# Patient Record
Sex: FEMALE | Race: WHITE | Employment: FULL TIME | URBAN - METROPOLITAN AREA
[De-identification: names, ages, dates, MRNs, and addresses within clinical notes are randomized per-mention and may not be internally consistent; named-entity substitution may affect disease eponyms.]

---

## 2017-03-22 ENCOUNTER — OP HISTORICAL/CONVERTED ENCOUNTER (OUTPATIENT)
Dept: OTHER | Age: 37
End: 2017-03-22

## 2017-11-15 ENCOUNTER — HOSPITAL ENCOUNTER (OUTPATIENT)
Age: 37
Setting detail: OUTPATIENT SURGERY
Discharge: HOME | End: 2017-11-15
Attending: SPECIALIST | Admitting: SPECIALIST
Payer: COMMERCIAL

## 2017-11-15 VITALS
HEART RATE: 78 BPM | RESPIRATION RATE: 16 BRPM | DIASTOLIC BLOOD PRESSURE: 66 MMHG | WEIGHT: 125 LBS | OXYGEN SATURATION: 100 % | BODY MASS INDEX: 24.54 KG/M2 | SYSTOLIC BLOOD PRESSURE: 123 MMHG | HEIGHT: 60 IN

## 2017-11-15 PROCEDURE — 77030020268 HC MISC GENERAL SUPPLY: Performed by: SPECIALIST

## 2017-11-15 PROCEDURE — 76040000007: Performed by: SPECIALIST

## 2017-11-15 RX ORDER — BUTALBITAL, ASPIRIN, AND CAFFEINE 325; 50; 40 MG/1; MG/1; MG/1
1 CAPSULE ORAL
COMMUNITY

## 2017-11-15 RX ORDER — DIHYDROERGOTAMINE MESYLATE 4 MG/ML
1 SPRAY NASAL AS NEEDED
COMMUNITY

## 2017-11-15 RX ORDER — FROVATRIPTAN SUCCINATE 2.5 MG/1
2.5 TABLET, FILM COATED ORAL
COMMUNITY

## 2017-11-15 RX ORDER — CYCLOBENZAPRINE HCL 5 MG
5 TABLET ORAL
COMMUNITY

## 2017-11-15 NOTE — PROGRESS NOTES
Rectal exam done by Tiffany Fan RN. Anal manometry catheter inserted into rectum. Manometry procedure complete. Catheter inserted into rectum. Balloon filled with 40 cc of luke warm H2O, and pt escorted to bathroom for 3 min expulsion test.  Pt was not able to expel balloon. Balloon deflated and catheter removed. Pt tolerated procedures well.

## 2017-11-15 NOTE — IP AVS SNAPSHOT
[address] [facility] 
[phone] Patient: Kam Lee MRN: [id number] :1980 About your hospitalization You were admitted on:  November 15, 2017 You last received care in the:  [facility] ENDOSCOPY You were discharged on:  November 15, 2017 Why you were hospitalized Your primary diagnosis was:  Not on File Discharge Orders None A check jose indicates which time of day the medication should be taken. My Medications ASK your physician about these medications Instructions Each Dose to Equal  
 Morning Noon Evening Bedtime  
 butalbital-aspirin-caffeine capsule Commonly known as:  Wandra Mitten Your last dose was: Your next dose is: Take 1 Cap by mouth every four (4) hours as needed for Pain. 1 Cap  
    
   
   
   
  
 cyclobenzaprine 5 mg tablet Commonly known as:  FLEXERIL Your last dose was: Your next dose is: Take 5 mg by mouth. 5 mg  
    
   
   
   
  
 dihydroergotamine 0.5 mg/pump act. (4 mg/mL) nasal spray Commonly known as:  Reinier Quesada Your last dose was: Your next dose is:    
   
   
 1 Spray by Nasal route as needed for Migraine. use in one nostril as directed. No more than 4 sprays in one hour 1 Spray  
    
   
   
   
  
 frovatriptan 2.5 mg tablet Commonly known as:  El Dinning Your last dose was: Your next dose is: Take 2.5 mg by mouth once as needed for Migraine. 2.5 mg  
    
   
   
   
  
 LINZESS 290 mcg Cap capsule Generic drug:  linaclotide Your last dose was: Your next dose is: Take  by mouth Daily (before breakfast). MULTIVITAMIN PO Your last dose was: Your next dose is: Take  by mouth. Discharge Instructions Kam Lee [id number] 
1980 MANOMETRY DISCHARGE INSTRUCTION You may resume your regular diet as tolerated. You may resume your normal daily activities Call your Physician if you have any complications or questions. [employer] Activation Thank you for requesting access to [employer]. Please follow the instructions below to securely access and download your online medical record. [employer] allows you to send messages to your doctor, view your test results, renew your prescriptions, schedule appointments, and more. How Do I Sign Up? 1. In your internet browser, go to [email or web address] 
2. Click on the First Time User? Click Here link in the Sign In box. You will be redirect to the New Member Sign Up page. 3. Enter your [employer] Access Code exactly as it appears below. You will not need to use this code after youve completed the sign-up process. If you do not sign up before the expiration date, you must request a new code. [employer] Access Code: [email or web address] Expires: 2018  1:35 PM (This is the date your [employer] access code will ) 4. Enter the last four digits of your Social Security Number (xxxx) and Date of Birth (mm/dd/yyyy) as indicated and click Submit. You will be taken to the next sign-up page. 5. Create a [employer] ID. This will be your [employer] login ID and cannot be changed, so think of one that is secure and easy to remember. 6. Create a [employer] password. You can change your password at any time. 7. Enter your Password Reset Question and Answer. This can be used at a later time if you forget your password. 8. Enter your e-mail address. You will receive e-mail notification when new information is available in [address] 9. Click Sign Up. You can now view and download portions of your medical record. 10. Click the Download Summary menu link to download a portable copy of your medical information. Additional Information If you have questions, please visit the Frequently Asked Questions section of the [employer] website at [email or web address] ([email or web address]). [employer]/. Remember, AddressReport is NOT to be used for urgent needs. For medical emergencies, dial 911. Introducing [facility]! David Hutton introduces AddressReport patient portal. Now you can access parts of your medical record, email your doctor's office, and request medication refills online. 1. In your internet browser, go to [email or web address] ([email or web address]). [employer] 2. Click on the First Time User? Click Here link in the Sign In box. You will see the New Member Sign Up page. 3. Enter your AddressReport Access Code exactly as it appears below. You will not need to use this code after youve completed the sign-up process. If you do not sign up before the expiration date, you must request a new code. · [employer] Access Code: [email or web address] Expires: 2/13/2018  1:35 PM 
 
4. Enter the last four digits of your Social Security Number (xxxx) and Date of Birth (mm/dd/yyyy) as indicated and click Submit. You will be taken to the next sign-up page. 5. Create a AddressReport ID. This will be your AddressReport login ID and cannot be changed, so think of one that is secure and easy to remember. 6. Create a AddressReport password. You can change your password at any time. 7. Enter your Password Reset Question and Answer. This can be used at a later time if you forget your password. 8. Enter your e-mail address. You will receive e-mail notification when new information is available in [address] 9. Click Sign Up. You can now view and download portions of your medical record. 10. Click the Download Summary menu link to download a portable copy of your medical information. If you have questions, please visit the Frequently Asked Questions section of the AddressReport website. Remember, AddressReport is NOT to be used for urgent needs. For medical emergencies, dial 911. Now available from your iPhone and Android! Providers Seen During Your Hospitalization Provider Specialty Primary office phone Tanya Dubon MD Gastroenterology [phone] Your Primary Care Physician (PCP) Primary Care Physician Office Phone Office Fax Julisa Felder [phone] You are allergic to the following Allergen Reactions Latex Hives Pcn (Penicillins) Unknown (comments) Reaction as a child she was not sure exactly what Recent Documentation Height Weight Breastfeeding? BMI Smoking Status 1.524 m 56.7 kg No 24.41 kg/m2 Never Smoker Emergency Contacts Name Discharge Info Relation Home Work Mobile Freddy Meier DISCHARGE CAREGIVER [3] Spouse [3] [phone] Patient Belongings The following personal items are in your possession at time of discharge: 
  Dental Appliances: None Please provide this summary of care documentation to your next provider. Signatures-by signing, you are acknowledging that this After Visit Summary has been reviewed with you and you have received a copy. Patient Signature:  ____________________________________________________________ Date:  ____________________________________________________________  
  
[facility] Provider Signature:  ____________________________________________________________ Date:  ____________________________________________________________

## 2017-12-21 ENCOUNTER — OP HISTORICAL/CONVERTED ENCOUNTER (OUTPATIENT)
Dept: OTHER | Age: 37
End: 2017-12-21

## 2018-01-24 ENCOUNTER — HOSPITAL ENCOUNTER (OUTPATIENT)
Dept: MRI IMAGING | Age: 38
Discharge: HOME | End: 2018-01-24
Attending: SPECIALIST
Payer: COMMERCIAL

## 2018-01-24 DIAGNOSIS — K64.8 PROLAPSED HEMORRHOIDS: ICD-10-CM

## 2018-01-24 DIAGNOSIS — R10.9 ABDOMINAL PAIN: ICD-10-CM

## 2018-01-24 DIAGNOSIS — K59.00 CONSTIPATION: ICD-10-CM

## 2018-01-24 PROCEDURE — 72195 MRI PELVIS W/O DYE: CPT

## 2018-01-24 PROCEDURE — 74011000255 HC RX REV CODE- 255: Performed by: SPECIALIST

## 2018-01-24 RX ADMIN — BARIUM SULFATE 900 ML: 1 SUSPENSION ORAL at 11:19

## 2019-02-08 ENCOUNTER — OP HISTORICAL/CONVERTED ENCOUNTER (OUTPATIENT)
Dept: OTHER | Age: 39
End: 2019-02-08

## 2019-02-22 ENCOUNTER — OP HISTORICAL/CONVERTED ENCOUNTER (OUTPATIENT)
Dept: OTHER | Age: 39
End: 2019-02-22

## 2020-09-02 RX ORDER — PHENTERMINE HYDROCHLORIDE 37.5 MG/1
TABLET ORAL
Qty: 90 TAB | Refills: 1 | Status: SHIPPED | OUTPATIENT
Start: 2020-09-02 | End: 2021-04-23

## 2020-10-26 DIAGNOSIS — K74.3 PRIMARY BILIARY CIRRHOSIS (HCC): Primary | ICD-10-CM

## 2020-11-24 ENCOUNTER — OFFICE VISIT (OUTPATIENT)
Dept: HEMATOLOGY | Age: 40
End: 2020-11-24
Payer: COMMERCIAL

## 2020-11-24 VITALS
WEIGHT: 113 LBS | RESPIRATION RATE: 18 BRPM | DIASTOLIC BLOOD PRESSURE: 67 MMHG | HEART RATE: 96 BPM | BODY MASS INDEX: 21.34 KG/M2 | TEMPERATURE: 97 F | SYSTOLIC BLOOD PRESSURE: 102 MMHG | OXYGEN SATURATION: 100 % | HEIGHT: 61 IN

## 2020-11-24 DIAGNOSIS — R76.8 ANTIMITOCHONDRIAL ANTIBODY POSITIVE: Primary | ICD-10-CM

## 2020-11-24 PROBLEM — K58.9 IBS (IRRITABLE BOWEL SYNDROME): Status: ACTIVE | Noted: 2020-11-24

## 2020-11-24 PROBLEM — D64.9 ANEMIA: Status: ACTIVE | Noted: 2020-11-24

## 2020-11-24 PROBLEM — G43.909 MIGRAINE: Status: ACTIVE | Noted: 2020-11-24

## 2020-11-24 PROCEDURE — 99205 OFFICE O/P NEW HI 60 MIN: CPT | Performed by: HOSPITALIST

## 2020-11-24 NOTE — PATIENT INSTRUCTIONS
We will perform blood work today We will schedule you for a liver ultrasound We will have you return to the clinic in 4 weeks for fibro scan to assess for fibrosis or scarring of your liver

## 2020-11-24 NOTE — PROGRESS NOTES
Chief Complaint   Patient presents with   [facility].     new patient, elevated labs         1. Have you been to the ER, urgent care clinic since your last visit? Hospitalized since your last visit? no    2. Have you seen or consulted any other health care providers outside of the [address] [place] [address] since your last visit? Include any pap smears or colon screening.   no

## 2020-11-24 NOTE — PROGRESS NOTES
[address]      Adithya Draper MD, Gerber Morgan, Thee Bhagat MD, MPH      Florencio Liu, SHAN Husain, [facility]     Nakia Mckeon, [facility]   [facility] Quint[facility]    Theresa Marcial, [facility]       Adrian Davis [facility] 136    at [address], [address] [place] [address], [facility] 22.    [phone]    FAX: [address] [place] [address] Avenue    [facility]    [address], [address]    [address] sh[address]is, [address]    [phone]    FAX: [phone]     Patient Care Team:  Savi Fitzpatrick MD as PCP - General (Family Medicine)    Problem List  Date Reviewed: 11/24/2020          Codes Class Noted    Anemia ICD-10-CM: D64.9  ICD-9-CM: 285.9  11/24/2020        Migraine ICD-10-CM: N31.932  ICD-9-CM: 346.90  11/24/2020        IBS (irritable bowel syndrome) ICD-10-CM: K58.9  ICD-9-CM: 564.1  11/24/2020        VSD (ventricular septal defect and aortic arch hypoplasia ICD-10-CM: Q21.0, Q25.42  ICD-9-CM: 745.4, 747.21  11/17/2011        Dizzy spells ICD-10-CM: R42  ICD-9-CM: 780.4  11/17/2011            The clinicians listed above have asked me to see Lester Reyes in consultation regarding positive anti mitochondrial antibody  All medical records sent by the referring physicians we re reviewed     The patient is a 37year old  female who was noted to have tested positive for anti mitochondrial antibody after routine screening for hair loss at work. Blood work performed was positive for AMA at 37    The result of liver function test is not available for me to review. Imaging of the liver was either not performed or the results are not available to me. An assessment of liver fibrosis with biopsy or elastography has not been performed. The patient has no extrahepatic autoimmune disorders.         The patient reports fatigue    The patient is not currently experiencing the following symptoms of liver disease: pain in the right side over the liver,  yellowing of the eyes or skin,   problems concentrating, swelling of the abdomen, swelling of the lower extremities, hematemesis, hematochezia. The patient completes all daily activities without any functional limitations. ASSESSMENT AND PLAN:    Positive antimitochondrial antibody and normal alkaline phosphatase  Liver transaminase is normal, Alkaline phosphatase is normal, the liver function is normal, total bilirubin is normal. Serum platelet is normal.  Based upon laboratory studies and imaging  the patient does not appear to have significant liver injury. There is no evidence that patient has primary biliary cholangitis. We will have to fulfill 2 of 3 diagnostic criteria in order to diagnose PBC. The need to perform an assessment of liver fibrosis was discussed with the patient. The Fibroscan can assess liver fibrosis and determine if a patient has advanced fibrosis or cirrhosis without the need for liver biopsy. This will be performed at the next office visit. If the Fibroscan suggests advanced fibrosis then a liver biopsy should be considered. Have performed laboratory testing to monitor liver function and degree of liver injury. This included BMP, hepatic panel, CBC with platelet count, INR. Will perform serologic and virologic studies to assess for other causes of chronic liver disease. Will perform imaging of the liver with ultrasound. Vitamin malabsorption  Patients with PBC do not efficiently absorb fat soluble vitamins A,D,E, K. It has been recommended that the patient take multivitamins with excess fat soluble vitamins. Breast cancer screeing   Women with PBC have an increased risk of breast cancer and should undergo regular mammography screenings.     Screening for Hepatocellular Carcinoma  HCC screening is not necessary if the patient has no evidence of cirrhosis. Treatment of other medical problems in patients with chronic liver disease  There are no contraindications for the patient to take most medications that are necessary for treatment of other medical issues. Counseling for alcohol in patients with chronic liver disease  The patient does not have a chronic liver disease and does not have to be abstinent from alcohol. Vaccinations   The need for vaccination against viral hepatitis A and B will be assessed with serologic and instituted as appropriate. Routine vaccinations against other bacterial and viral agents can be performed as indicated. Annual flu vaccination should be administered if indicated. Iron deficiency anemia  Serum ferritin is 2, iron saturation is 3%. We will refer patient to infusion center for intravenous iron infusion. Thrombocytosis  This is reactionary from iron deficiency anemia. ALLERGIES  Allergies   Allergen Reactions    Latex Hives    Pcn [Penicillins] Unknown (comments)     Reaction as a child she was not sure exactly what       MEDICATIONS  Current Outpatient Medications   Medication Sig    linaclotide (LINZESS) 290 mcg cap capsule Take  by mouth Daily (before breakfast).  dihydroergotamine (MIGRANAL) 0.5 mg/pump act. (4 mg/mL) nasal spray 1 Spray by Nasal route as needed for Migraine. use in one nostril as directed. No more than 4 sprays in one hour    frovatriptan (FROVA) 2.5 mg tablet Take 2.5 mg by mouth once as needed for Migraine.  phentermine (ADIPEX-P) 37.5 mg tablet TAKE ONE-HALF TABLET BY MOUTH TWICE A DAY BEFORE BREAKFAST AND AFTER LUNCH    butalbital-aspirin-caffeine (FIORINAL) capsule Take 1 Cap by mouth every four (4) hours as needed for Pain.  cyclobenzaprine (FLEXERIL) 5 mg tablet Take 5 mg by mouth.  MULTIVITAMIN PO Take  by mouth. No current facility-administered medications for this visit.         SYSTEM REVIEW NOT RELATED TO LIVER DISEASE OR REVIEWED ABOVE:  Constitution systems: Negative for fever, chills, weight gain, weight loss. Eyes: Negative for visual changes. ENT: Negative for sore throat, painful swallowing. Respiratory: Negative for cough, hemoptysis, SOB. Cardiology: Negative for chest pain, palpitations. GI:  Negative for constipation or diarrhea. : Negative for urinary frequency, dysuria, hematuria, nocturia. Skin: Negative for rash. Hematology: Negative for easy bruising, blood clots. Musculo-skelatal: Negative for back pain, muscle pain, weakness. Neurologic: Negative for headaches, dizziness, vertigo, memory problems not related to HE. Psychology: Negative for anxiety, depression. FAMILY HISTORY:  The father  Has/had the following following chronic disease(s): Dyslipidemia   The mother Has/had the following chronic disease(s): HTN, dyslipidemia  There is no family history of liver disease. There is no family history of immune disorders. SOCIAL HISTORY:  The patient is . The patient has 2 children. The patient has never used tobacco products. The patient drink alcohol occasionally  The patient currently works full time as a     PHYSICAL EXAMINATION:  Visit Vitals  /67   Pulse 96   Temp 97 °F (36.1 °C) (Temporal)   Resp 18   Ht 5' 1\" (1.549 m)   Wt 113 lb (51.3 kg)   SpO2 100%   BMI 21.35 kg/m²     General: No acute distress. Eyes: Sclera anicteric. ENT: No oral lesions. Thyroid normal.  Nodes: No adenopathy. Skin: No spider angiomata. No jaundice. No palmar erythema. Respiratory: Lungs clear to auscultation. Cardiovascular: Regular heart rate. No murmurs. No JVD. Abdomen: Soft non-tender. Liver size normal to percussion/palpation. Spleen not palpable. No obvious ascites. Extremities: No edema. No muscle wasting. No gross arthritic changes. Neurologic: Alert and oriented. Cranial nerves grossly intact.   No asterixis. LABORATORY STUDIES:  Recent liver function panel, CBC with platelet count and BMP are not available. These studies will be performed. Liver [place] of [address] [place] [address] Ref Rng & Units 11/24/2020   WBC 3.6 - 11.0 K/uL 5.3   ANC 1.8 - 8.0 K/UL 3.1   HGB 11.5 - 16.0 g/dL 9.7 (L)    - 400 K/uL 438 (H)   AST 15 - 37 U/L 20   ALT 12 - 78 U/L 20   Alk Phos 45 - 117 U/L 66   Bili, Total 0.2 - 1.0 MG/DL 0.2   Albumin 3.5 - 5.0 g/dL 3.8   BUN 6 - 20 MG/DL 8   Creat 0.55 - 1.02 MG/DL 0.78   Na 136 - 145 mmol/L 141   K 3.5 - 5.1 mmol/L 3.9   Cl 97 - 108 mmol/L 108   CO2 21 - 32 mmol/L 27   Glucose 65 - 100 mg/dL 76       SEROLOGIES:  Not available or performed. Testing will be performed as indicated. Serologies Latest Ref Rng & Units 11/24/2020   Hep A Ab, Total Negative   Positive (A)   Hep B Surface Ag Index <0.10   Hep B Surface Ag Interp Negative   Negative   Hep B Core Ab, Total Negative   Negative   Hep B Surface Ab mIU/mL <3.10   Hep B Surface Ab Interp NONREACTIVE   NONREACTIVE   Hep C Ab 0.0 - 0.9 s/co ratio <0.1   Ferritin 26 - 388 NG/ML 2 (L)   Iron % Saturation 20 - 50 % 3 (L)   NAZ, IFA  Negative   ASMCA 0 - 19 Units 4   M2 Ab 0.0 - 20.0 Units 33.1 (H)     LIVER HISTOLOGY:  Not available or performed    ENDOSCOPIC PROCEDURES:  Not available or performed    RADIOLOGY:  11/2020: Normal abdominal ultrasound. No evidence of intra or extrahepatic biliary dilatation    OTHER TESTING:  Not available or performed    FOLLOW-UP:  All of the issues listed above in the Assessment and Plan were discussed with the patient. All questions were answered. The patient expressed a clear understanding of the above. [facility] 87 in 4 weeks for Fibroscan and to review all data and determine the treatment plan.     Pepe Self MD, MPH  Advanced Hepatology  [facility] [place] 136  [address] 22.  [phone]  BON Mendez Louie

## 2020-11-24 NOTE — LETTER
12/14/20 Patient: Lien Kenney YOB: 1980 Date of Visit: 11/24/2020 Nataly Rodriguez MD 
[place] [facility] [address] [facility] [id number] VIA In Basket Dear Nataly Rodriguez MD, Thank you for referring Ms. Irina Brunson to [address] for evaluation. My notes for this consultation are attached. If you have questions, please do not hesitate to call me. I look forward to following your patient along with you. Sincerely, Will Contreras MD

## 2020-11-26 LAB
ACTIN IGG SERPL-ACNC: 4 UNITS (ref 0–19)
COMMENT, 144067: NORMAL
HAV AB SER QL IA: POSITIVE
HBV CORE AB SERPL QL IA: NEGATIVE
HCV AB S/CO SERPL IA: <0.1 S/CO RATIO (ref 0–0.9)
MITOCHONDRIA M2 IGG SER-ACNC: 33.1 UNITS (ref 0–20)

## 2020-11-30 ENCOUNTER — HOSPITAL ENCOUNTER (OUTPATIENT)
Dept: ULTRASOUND IMAGING | Age: 40
Discharge: HOME | End: 2020-11-30
Attending: HOSPITALIST
Payer: COMMERCIAL

## 2020-11-30 DIAGNOSIS — R76.8 ANTIMITOCHONDRIAL ANTIBODY POSITIVE: ICD-10-CM

## 2020-11-30 PROCEDURE — 76700 US EXAM ABDOM COMPLETE: CPT

## 2020-12-01 LAB
ALBUMIN SERPL-MCNC: 3.8 G/DL (ref 3.5–5)
ALBUMIN/GLOB SERPL: 1.4 {RATIO} (ref 1.1–2.2)
ALP SERPL-CCNC: 66 U/L (ref 45–117)
ALT SERPL-CCNC: 20 U/L (ref 12–78)
ANA TITR SER IF: NEGATIVE {TITER}
ANION GAP SERPL CALC-SCNC: 6 MMOL/L (ref 5–15)
AST SERPL-CCNC: 20 U/L (ref 15–37)
BASOPHILS # BLD: 0 K/UL (ref 0–0.1)
BASOPHILS NFR BLD: 1 % (ref 0–1)
BILIRUB SERPL-MCNC: 0.2 MG/DL (ref 0.2–1)
BUN SERPL-MCNC: 8 MG/DL (ref 6–20)
BUN/CREAT SERPL: 10 (ref 12–20)
CALCIUM SERPL-MCNC: 8.7 MG/DL (ref 8.5–10.1)
CHLORIDE SERPL-SCNC: 108 MMOL/L (ref 97–108)
CO2 SERPL-SCNC: 27 MMOL/L (ref 21–32)
CREAT SERPL-MCNC: 0.78 MG/DL (ref 0.55–1.02)
DIFFERENTIAL METHOD BLD: ABNORMAL
EOSINOPHIL # BLD: 0 K/UL (ref 0–0.4)
EOSINOPHIL NFR BLD: 0 % (ref 0–7)
ERYTHROCYTE [DISTWIDTH] IN BLOOD BY AUTOMATED COUNT: 14.7 % (ref 11.5–14.5)
FERRITIN SERPL-MCNC: 2 NG/ML (ref 26–388)
GLOBULIN SER CALC-MCNC: 2.8 G/DL (ref 2–4)
GLUCOSE SERPL-MCNC: 76 MG/DL (ref 65–100)
HBV SURFACE AB SER QL: NONREACTIVE
HBV SURFACE AB SER-ACNC: <3.1 MIU/ML
HBV SURFACE AG SER QL: <0.1 INDEX
HBV SURFACE AG SER QL: NEGATIVE
HCT VFR BLD AUTO: 32.4 % (ref 35–47)
HGB BLD-MCNC: 9.7 G/DL (ref 11.5–16)
IMM GRANULOCYTES # BLD AUTO: 0 K/UL (ref 0–0.04)
IMM GRANULOCYTES NFR BLD AUTO: 0 % (ref 0–0.5)
IRON SATN MFR SERPL: 3 % (ref 20–50)
IRON SERPL-MCNC: 13 UG/DL (ref 35–150)
LYMPHOCYTES # BLD: 1.9 K/UL (ref 0.8–3.5)
LYMPHOCYTES NFR BLD: 35 % (ref 12–49)
MCH RBC QN AUTO: 24.3 PG (ref 26–34)
MCHC RBC AUTO-ENTMCNC: 29.9 G/DL (ref 30–36.5)
MCV RBC AUTO: 81 FL (ref 80–99)
MONOCYTES # BLD: 0.3 K/UL (ref 0–1)
MONOCYTES NFR BLD: 6 % (ref 5–13)
NEUTS SEG # BLD: 3.1 K/UL (ref 1.8–8)
NEUTS SEG NFR BLD: 58 % (ref 32–75)
NRBC # BLD: 0 K/UL (ref 0–0.01)
NRBC BLD-RTO: 0 PER 100 WBC
PLATELET # BLD AUTO: 438 K/UL (ref 150–400)
PMV BLD AUTO: 9.9 FL (ref 8.9–12.9)
POTASSIUM SERPL-SCNC: 3.9 MMOL/L (ref 3.5–5.1)
PROT SERPL-MCNC: 6.6 G/DL (ref 6.4–8.2)
RBC # BLD AUTO: 4 M/UL (ref 3.8–5.2)
SODIUM SERPL-SCNC: 141 MMOL/L (ref 136–145)
TIBC SERPL-MCNC: 386 UG/DL (ref 250–450)
TSH SERPL DL<=0.05 MIU/L-ACNC: 0.61 UIU/ML (ref 0.36–3.74)
WBC # BLD AUTO: 5.3 K/UL (ref 3.6–11)

## 2020-12-15 ENCOUNTER — OFFICE VISIT (OUTPATIENT)
Dept: HEMATOLOGY | Age: 40
End: 2020-12-15
Payer: COMMERCIAL

## 2020-12-15 VITALS
HEART RATE: 82 BPM | RESPIRATION RATE: 16 BRPM | BODY MASS INDEX: 21.49 KG/M2 | OXYGEN SATURATION: 100 % | DIASTOLIC BLOOD PRESSURE: 65 MMHG | TEMPERATURE: 97.9 F | WEIGHT: 113.8 LBS | HEIGHT: 61 IN | SYSTOLIC BLOOD PRESSURE: 102 MMHG

## 2020-12-15 DIAGNOSIS — R76.8 ANTIMITOCHONDRIAL ANTIBODY POSITIVE: Primary | ICD-10-CM

## 2020-12-15 PROCEDURE — 91200 LIVER ELASTOGRAPHY: CPT | Performed by: HOSPITALIST

## 2020-12-15 PROCEDURE — 99215 OFFICE O/P EST HI 40 MIN: CPT | Performed by: HOSPITALIST

## 2020-12-15 RX ORDER — ALPRAZOLAM 0.5 MG/1
TABLET ORAL
COMMUNITY
Start: 2020-10-22 | End: 2021-01-18

## 2020-12-15 RX ORDER — ERGOCALCIFEROL 1.25 MG/1
CAPSULE ORAL
COMMUNITY
Start: 2020-11-04

## 2020-12-15 RX ORDER — CLINDAMYCIN HYDROCHLORIDE 150 MG/1
CAPSULE ORAL
COMMUNITY
Start: 2020-12-03

## 2020-12-15 NOTE — PROGRESS NOTES
[address]      Marino Clark MD, Nemesio Simms, Dominic Lefort, MD, MPH      Maurilio Mitchell, SHAN Hdz, ACNP-BC     Nakia Mckeon, [facility]   Ruthanna Opitz, FNP-C    León Cheung, [facility]-BC       Adrianchristoph Davis Richard De Crowder 136    at [address], [facility], [facility] 22.    [phone]    FAX: [address]    [facility]    [address], [address]    98 shanika Solis, [address]    [phone]    [address] [place] [address] Street: [phone]     Patient Care Team:  Do Smart MD as PCP - General (Family Medicine)    Problem List  Date Reviewed: 12/14/2020          Codes Class Noted    Anemia ICD-10-CM: D64.9  ICD-9-CM: 285.9  11/24/2020        Migraine ICD-10-CM: D04.739  ICD-9-CM: 346.90  11/24/2020        IBS (irritable bowel syndrome) ICD-10-CM: K58.9  ICD-9-CM: 564.1  11/24/2020        VSD (ventricular septal defect and aortic arch hypoplasia ICD-10-CM: Q21.0, Q25.42  ICD-9-CM: 745.4, 747.21  11/17/2011        Dizzy spells ICD-10-CM: R42  ICD-9-CM: 780.4  11/17/2011             [facility] in consultation regarding positive anti mitochondrial antibody  All medical records sent by the referring physicians we re reviewed     The patient is a 37year old  female who was noted to have tested positive for anti mitochondrial antibody after routine screening for hair loss at work. Blood work performed was positive for AMA at 37    The result of liver function test performed here in the clinic was normal.      Serologic markers for causes of chronic liver disease was positive for hepatitis A total antibody, antimitochondrial antibody. Serologies was negative for NAZ, ASMA, and hepatitis B and C virus. Serum ferritin was 2, iron saturation was 3.   Hemoglobin is low at 9.7    Imaging of the liver performed in 11/2020. This demonstrated normal liver    FibroScan performed at [address]. This shows EkPa  4.4. IQR/med 14%. . The results suggested a fibrosis level of F0. The CAP score suggests no evidence of fatty liver. The patient has no extrahepatic autoimmune disorders. The patient reports fatigue    The patient is not currently experiencing the following symptoms of liver disease: pain in the right side over the liver,  yellowing of the eyes or skin,   problems concentrating, swelling of the abdomen, swelling of the lower extremities, hematemesis, hematochezia. The patient completes all daily activities without any functional limitations. ASSESSMENT AND PLAN:  Positive antimitochondrial antibody and normal alkaline phosphatase    Liver transaminase is normal, Alkaline phosphatase is normal, the liver function is normal, total bilirubin is normal. Serum platelet is normal.  Based upon laboratory studies and imaging and FibroScan the patient does not appear to have significant liver injury. Histologic severity showed fibrosis score of 0. Imaging of the liver was normal    There is no evidence that patient has primary biliary cholangitis. We will have to fulfill 2 of 3 diagnostic criteria in order to diagnose PBC. We will monitor liver enzymes intermittently and this will be repeated in 6 months on patient's next clinic visit    Breast cancer screeing   Women with PBC have an increased risk of breast cancer and should undergo regular mammography screenings. Screening for Hepatocellular Carcinoma  HCC screening is not necessary if the patient has no evidence of cirrhosis. Treatment of other medical problems in patients with chronic liver disease  There are no contraindications for the patient to take most medications that are necessary for treatment of other medical issues.     Counseling for alcohol in patients with chronic liver disease  The patient does not have a chronic liver disease and does not have to be abstinent from alcohol. Vaccinations   Vaccination for hepatitis A virus is not needed. Patient is not immune to hepatitis B virus. Patient may be vaccinated if she so wishes  Routine vaccinations against other bacterial and viral agents can be performed as indicated. Annual flu vaccination should be administered if indicated. Iron deficiency anemia  Serum ferritin is 2, iron saturation is 3% which is consistent with iron deficiency anemia. This is likely due to menorrhagia. Recommend patient start prenatal vitamins. She will need to follow-up with hematology for intravenous iron infusion  Recommend patient follow-up with gynecology for an oral contraceptive pill    Thrombocytosis  This is reactionary from iron deficiency anemia. ALLERGIES  Allergies   Allergen Reactions    Latex Hives    Pcn [Penicillins] Unknown (comments)     Reaction as a child she was not sure exactly what       MEDICATIONS  Current Outpatient Medications   Medication Sig    linaclotide (LINZESS) 290 mcg cap capsule Take  by mouth Daily (before breakfast).  frovatriptan (FROVA) 2.5 mg tablet Take 2.5 mg by mouth once as needed for Migraine.  MULTIVITAMIN PO Take  by mouth.  ALPRAZolam (XANAX) 0.5 mg tablet     clindamycin (CLEOCIN) 150 mg capsule     ergocalciferol (ERGOCALCIFEROL) 1,250 mcg (50,000 unit) capsule     phentermine (ADIPEX-P) 37.5 mg tablet TAKE ONE-HALF TABLET BY MOUTH TWICE A DAY BEFORE BREAKFAST AND AFTER LUNCH    dihydroergotamine (MIGRANAL) 0.5 mg/pump act. (4 mg/mL) nasal spray 1 Spray by Nasal route as needed for Migraine. use in one nostril as directed. No more than 4 sprays in one hour    butalbital-aspirin-caffeine (FIORINAL) capsule Take 1 Cap by mouth every four (4) hours as needed for Pain.  cyclobenzaprine (FLEXERIL) 5 mg tablet Take 5 mg by mouth.      No current facility-administered medications for this visit.        SYSTEM REVIEW NOT RELATED TO LIVER DISEASE OR REVIEWED ABOVE:  Constitution systems: Negative for fever, chills, weight gain, weight loss. Eyes: Negative for visual changes. ENT: Negative for sore throat, painful swallowing. Respiratory: Negative for cough, hemoptysis, SOB. Cardiology: Negative for chest pain, palpitations. GI:  Negative for constipation or diarrhea. : Negative for urinary frequency, dysuria, hematuria, nocturia. Skin: Negative for rash. Hematology: Negative for easy bruising, blood clots. Musculo-skelatal: Negative for back pain, muscle pain, weakness. Neurologic: Negative for headaches, dizziness, vertigo, memory problems not related to HE. Psychology: Negative for anxiety, depression. FAMILY HISTORY:  The father  Has/had the following following chronic disease(s): Dyslipidemia   The mother Has/had the following chronic disease(s): HTN, dyslipidemia  There is no family history of liver disease. There is no family history of immune disorders. SOCIAL HISTORY:  The patient is . The patient has 2 children. The patient has never used tobacco products. The patient drink alcohol occasionally  The patient currently works full time as a     PHYSICAL EXAMINATION:  Visit Vitals  /65 (BP 1 Location: Right arm, BP Patient Position: Sitting)   Pulse 82   Temp 97.9 °F (36.6 °C) (Temporal)   Resp 16   Ht 5' 1\" (1.549 m)   Wt 113 lb 12.8 oz (51.6 kg)   LMP 11/17/2020 (Approximate)   SpO2 100%   BMI 21.50 kg/m²     General: No acute distress. Eyes: Sclera anicteric. ENT: No oral lesions. Thyroid normal.  Nodes: No adenopathy. Skin: No spider angiomata. No jaundice. No palmar erythema. Respiratory: Lungs clear to auscultation. Cardiovascular: Regular heart rate. No murmurs. No JVD. Abdomen: Soft non-tender. Liver size normal to percussion/palpation. Spleen not palpable. No obvious ascites.   Extremities: No edema. No muscle wasting. No gross arthritic changes. Neurologic: Alert and oriented. Cranial nerves grossly intact. No asterixis. LABORATORY STUDIES:  Recent liver function panel, CBC with platelet count and BMP are not available. These studies will be performed. Liver [place] of [address] [place] [address] Ref Rng & Units 11/24/2020   WBC 3.6 - 11.0 K/uL 5.3   ANC 1.8 - 8.0 K/UL 3.1   HGB 11.5 - 16.0 g/dL 9.7 (L)    - 400 K/uL 438 (H)   AST 15 - 37 U/L 20   ALT 12 - 78 U/L 20   Alk Phos 45 - 117 U/L 66   Bili, Total 0.2 - 1.0 MG/DL 0.2   Albumin 3.5 - 5.0 g/dL 3.8   BUN 6 - 20 MG/DL 8   Creat 0.55 - 1.02 MG/DL 0.78   Na 136 - 145 mmol/L 141   K 3.5 - 5.1 mmol/L 3.9   Cl 97 - 108 mmol/L 108   CO2 21 - 32 mmol/L 27   Glucose 65 - 100 mg/dL 76       SEROLOGIES:  Not available or performed. Testing will be performed as indicated. Serologies Latest Ref Rng & Units 11/24/2020   Hep A Ab, Total Negative   Positive (A)   Hep B Surface Ag Index <0.10   Hep B Surface Ag Interp Negative   Negative   Hep B Core Ab, Total Negative   Negative   Hep B Surface Ab mIU/mL <3.10   Hep B Surface Ab Interp NONREACTIVE   NONREACTIVE   Hep C Ab 0.0 - 0.9 s/co ratio <0.1   Ferritin 26 - 388 NG/ML 2 (L)   Iron % Saturation 20 - 50 % 3 (L)   NAZ, IFA  Negative   ASMCA 0 - 19 Units 4   M2 Ab 0.0 - 20.0 Units 33.1 (H)     LIVER HISTOLOGY:  FibroScan performed at [address]. EkPa was 4.4. IQR/med 14%. . The results suggested a fibrosis level of F0. The CAP score suggests no evidence of fatty liver. ENDOSCOPIC PROCEDURES:  Not available or performed    RADIOLOGY:  11/2020: Normal abdominal ultrasound. No evidence of intra or extrahepatic biliary dilatation    OTHER TESTING:  Not available or performed    FOLLOW-UP:  All of the issues listed above in the Assessment and Plan were discussed with the patient. All questions were answered.   The patient expressed a clear understanding of the above.    [address] [address] in 6 months and to review all data and determine the treatment plan.     Pao Mehta MD, MPH  Advanced Hepatology  [facility] of [address] [address] Odilon Garcia, [address] [place] [address] 22.  [phone]  [address]

## 2020-12-15 NOTE — Clinical Note
12/16/2020 Patient: Alisa Young YOB: 1980 Date of Visit: 12/15/2020 Miquel Thompson MD 
[address] [place] [phone] Via Fax: [phone] Dear Miquel Thompson MD, Thank you for referring Ms. Monse Luna to [address] for evaluation. My notes for this consultation are attached. If you have questions, please do not hesitate to call me. I look forward to following your patient along with you. Sincerely, Chioma العلي MD

## 2020-12-15 NOTE — PROGRESS NOTES
Identified pt with two pt identifiers(name and ). Reviewed record in preparation for visit and have obtained necessary documentation. Chief Complaint   Patient presents with    Other     Fibroscan F/U      Vitals:    12/15/20 1307   BP: 102/65   Pulse: 82   Resp: 16   Temp: 97.9 °F (36.6 °C)   TempSrc: Temporal   SpO2: 100%   Weight: 113 lb 12.8 oz (51.6 kg)   Height: 5' 1\" (1.549 m)   PainSc:   0 - No pain   LMP: 2020       Health Maintenance Review: Patient reminded of \"due or due soon\" health maintenance. I have asked the patient to contact his/her primary care provider (PCP) for follow-up on his/her health maintenance. Coordination of Care Questionnaire:  :   1) Have you been to an emergency room, urgent care, or hospitalized since your last visit? If yes, where when, and reason for visit? no       2. Have seen or consulted any other health care provider since your last visit? If yes, where when, and reason for visit? NO      Patient is accompanied by self I have received verbal consent from Brandi Carter to discuss any/all medical information while they are present in the room.

## 2021-01-15 DIAGNOSIS — F41.9 ANXIETY: Primary | ICD-10-CM

## 2021-01-18 RX ORDER — ALPRAZOLAM 0.5 MG/1
TABLET ORAL
Qty: 90 TAB | Refills: 1 | Status: SHIPPED | OUTPATIENT
Start: 2021-01-18

## 2021-04-23 RX ORDER — PHENTERMINE HYDROCHLORIDE 37.5 MG/1
TABLET ORAL
Qty: 90 TAB | Refills: 1 | Status: SHIPPED | OUTPATIENT
Start: 2021-04-23 | End: 2021-11-01

## 2021-06-22 ENCOUNTER — OFFICE VISIT (OUTPATIENT)
Dept: HEMATOLOGY | Age: 41
End: 2021-06-22
Payer: COMMERCIAL

## 2021-06-22 VITALS
OXYGEN SATURATION: 100 % | HEIGHT: 61 IN | DIASTOLIC BLOOD PRESSURE: 60 MMHG | BODY MASS INDEX: 21.94 KG/M2 | TEMPERATURE: 97.5 F | RESPIRATION RATE: 20 BRPM | SYSTOLIC BLOOD PRESSURE: 104 MMHG | HEART RATE: 90 BPM | WEIGHT: 116.2 LBS

## 2021-06-22 DIAGNOSIS — D50.9 IRON DEFICIENCY ANEMIA, UNSPECIFIED IRON DEFICIENCY ANEMIA TYPE: ICD-10-CM

## 2021-06-22 DIAGNOSIS — R76.8 ANTIMITOCHONDRIAL ANTIBODY POSITIVE: Primary | ICD-10-CM

## 2021-06-22 PROCEDURE — 99214 OFFICE O/P EST MOD 30 MIN: CPT | Performed by: HOSPITALIST

## 2021-06-22 NOTE — PROGRESS NOTES
Identified pt with two pt identifiers(name and ). Reviewed record in preparation for visit and have obtained necessary documentation. Chief Complaint   Patient presents with    Follow-up      Vitals:    21 1455   BP: 104/60   Pulse: 90   Resp: 20   Temp: 97.5 °F (36.4 °C)   TempSrc: Temporal   SpO2: 100%   Weight: 116 lb 3.2 oz (52.7 kg)   Height: 5' 1\" (1.549 m)   PainSc:   0 - No pain       Health Maintenance Review: Patient reminded of \"due or due soon\" health maintenance. I have asked the patient to contact his/her primary care provider (PCP) for follow-up on his/her health maintenance. Coordination of Care Questionnaire:  :   1) Have you been to an emergency room, urgent care, or hospitalized since your last visit? If yes, where when, and reason for visit? no       2. Have seen or consulted any other health care provider since your last visit? If yes, where when, and reason for visit? YES  Neuro    Patient is accompanied by self I have received verbal consent from Keshawn Gates to discuss any/all medical information while they are present in the room.

## 2021-06-22 NOTE — PROGRESS NOTES
[address]      Jeanette Mcdaniel MD, Markos Mercado, Jakob Diamond MD, MPH      Nehal Burrell, PA-MAXIMILIANO Shultz, [facility]     Nakia Mckeon, [facility]   Pablo Velasco, FNP-C    Melody Natalia, [facility]       [facility] 136    at [place] [address] [address] [facility], [address] [place] [address], [facility] 22.    [phone]    FAX: [address] [place] [address] Avenue    [facility]    [address], [address]    [place], [address]    [phone]    [address] [place] [address] Street: [phone]     Patient Care Team:  Dara Lazaro MD as PCP - General (Family Medicine)    Problem List  Date Reviewed: 12/16/2020        Codes Class Noted    Anemia ICD-10-CM: D64.9  ICD-9-CM: 285.9  11/24/2020        Migraine ICD-10-CM: Q03.391  ICD-9-CM: 346.90  11/24/2020        IBS (irritable bowel syndrome) ICD-10-CM: K58.9  ICD-9-CM: 564.1  11/24/2020        VSD (ventricular septal defect and aortic arch hypoplasia ICD-10-CM: Q21.0, Q25.42  ICD-9-CM: 745.4, 747.21  11/17/2011        Dizzy spells ICD-10-CM: R42  ICD-9-CM: 780.4  11/17/2011             Bruce Nelson in consultation regarding positive anti mitochondrial antibody  All medical records sent by the referring physicians we re reviewed     The patient is a 37year old  female who was noted to have tested positive for anti mitochondrial antibody after routine screening for hair loss at work. Blood work performed was positive for AMA at 37    The result of liver function test performed here in the clinic was normal.      Serologic markers for causes of chronic liver disease was positive for hepatitis A total antibody, antimitochondrial antibody. Serologies was negative for NAZ, ASMA, and hepatitis B and C virus. Serum ferritin was 2, iron saturation was 3.   Hemoglobin is low at 9.7    Imaging of the liver performed in 11/2020. This demonstrated normal liver    FibroScan performed at [address]. This shows EkPa  4.4. IQR/med 14%. . The results suggested a fibrosis level of F0. The CAP score suggests no evidence of fatty liver. The patient has no extrahepatic autoimmune disorders. The patient reports fatigue    The patient is not currently experiencing the following symptoms of liver disease: pain in the right side over the liver,  yellowing of the eyes or skin,   problems concentrating, swelling of the abdomen, swelling of the lower extremities, hematemesis, hematochezia. The patient completes all daily activities without any functional limitations. Since clinic appointment  She was last seen in the clinic about 6 months ago  Apart from slight fatigue she does not have any new complaints. She does not have any symptoms attributable to liver disease. There is no abdominal pain over the liver, no ascites or jaundice  There is no intractable body itching      ASSESSMENT AND PLAN:  Positive antimitochondrial antibody and normal alkaline phosphatase    Liver transaminase is normal, Alkaline phosphatase is normal, the liver function is normal, total bilirubin is normal. Serum platelet is normal.  Based upon laboratory studies and imaging and FibroScan the patient does not appear to have significant liver injury. Histologic severity showed fibrosis score of 0. Imaging of the liver was normal    There is no evidence that patient has primary biliary cholangitis. We will have to fulfill 2 of 3 diagnostic criteria in order to diagnose PBC. We will repeat laboratory studies to monitor liver function. Breast cancer screeing   Women with PBC have an increased risk of breast cancer and should undergo regular mammography screenings. Screening for Hepatocellular Carcinoma  HCC screening is not necessary if the patient has no evidence of cirrhosis.     Treatment of other medical problems in patients with chronic liver disease  There are no contraindications for the patient to take most medications that are necessary for treatment of other medical issues. Counseling for alcohol in patients with chronic liver disease  The patient does not have a chronic liver disease and does not have to be abstinent from alcohol. Vaccinations   Vaccination for hepatitis A virus is not needed. Patient is not immune to hepatitis B virus. Patient may be vaccinated if she so wishes  Routine vaccinations against other bacterial and viral agents can be performed as indicated. Annual flu vaccination should be administered if indicated. Iron deficiency anemia  She underwent iron infusion x3 sessions  We will repeat CBC with iron profile and ferritin  Continue follow-up with hematology  Recommend some form of contraception to reduce monthly menstrual blood flow. Follow-up with OB/GYN    Thrombocytosis  This is reactionary from iron deficiency anemia. ALLERGIES  Allergies   Allergen Reactions    Latex Hives    Pcn [Penicillins] Unknown (comments)     Reaction as a child she was not sure exactly what       MEDICATIONS  Current Outpatient Medications   Medication Sig    phentermine (ADIPEX-P) 37.5 mg tablet TAKE ONE-HALF TABLET BY MOUTH TWICE A DAY BEFORE BREAKFAST AND AFTER LUNCH    ALPRAZolam (XANAX) 0.5 mg tablet TAKE ONE TABLET BY MOUTH ONE TIME DAILY AT BEDTIME    clindamycin (CLEOCIN) 150 mg capsule     ergocalciferol (ERGOCALCIFEROL) 1,250 mcg (50,000 unit) capsule     linaclotide (LINZESS) 290 mcg cap capsule Take  by mouth Daily (before breakfast).  dihydroergotamine (MIGRANAL) 0.5 mg/pump act. (4 mg/mL) nasal spray 1 Spray by Nasal route as needed for Migraine. use in one nostril as directed. No more than 4 sprays in one hour    butalbital-aspirin-caffeine (FIORINAL) capsule Take 1 Cap by mouth every four (4) hours as needed for Pain.     frovatriptan (FROVA) 2.5 mg tablet Take 2.5 mg by mouth once as needed for Migraine.  cyclobenzaprine (FLEXERIL) 5 mg tablet Take 5 mg by mouth.  MULTIVITAMIN PO Take  by mouth. No current facility-administered medications for this visit. SYSTEM REVIEW NOT RELATED TO LIVER DISEASE OR REVIEWED ABOVE:  Constitution systems: Negative for fever, chills, weight gain, weight loss. Eyes: Negative for visual changes. ENT: Negative for sore throat, painful swallowing. Respiratory: Negative for cough, hemoptysis, SOB. Cardiology: Negative for chest pain, palpitations. GI:  Negative for constipation or diarrhea. : Negative for urinary frequency, dysuria, hematuria, nocturia. Skin: Negative for rash. Hematology: Negative for easy bruising, blood clots. Musculo-skelatal: Negative for back pain, muscle pain, weakness. Neurologic: Negative for headaches, dizziness, vertigo, memory problems not related to HE. Psychology: Negative for anxiety, depression. FAMILY HISTORY:  The father  Has/had the following following chronic disease(s): Dyslipidemia   The mother Has/had the following chronic disease(s): HTN, dyslipidemia  There is no family history of liver disease. There is no family history of immune disorders. SOCIAL HISTORY:  The patient is . The patient has 2 children. The patient has never used tobacco products. The patient drink alcohol occasionally  The patient currently works full time as a     PHYSICAL EXAMINATION:  Visit Vitals  /60 (BP 1 Location: Right upper arm, BP Patient Position: Sitting, BP Cuff Size: Adult)   Pulse 90   Temp 97.5 °F (36.4 °C) (Temporal)   Resp 20   Ht 5' 1\" (1.549 m)   Wt 116 lb 3.2 oz (52.7 kg)   SpO2 100%   BMI 21.96 kg/m²     General: No acute distress. Eyes: Sclera anicteric. ENT: No oral lesions. Thyroid normal.  Nodes: No adenopathy. Skin: No spider angiomata. No jaundice. No palmar erythema.   Respiratory: Lungs clear to auscultation. Cardiovascular: Regular heart rate. No murmurs. No JVD. Abdomen: Soft non-tender. Liver size normal to percussion/palpation. Spleen not palpable. No obvious ascites. Extremities: No edema. No muscle wasting. No gross arthritic changes. Neurologic: Alert and oriented. Cranial nerves grossly intact. No asterixis. LABORATORY STUDIES:  Recent liver function panel, CBC with platelet count and BMP are not available. These studies will be performed. Liver [place] [address] Ref Rng & Units 11/24/2020   WBC 3.6 - 11.0 K/uL 5.3   ANC 1.8 - 8.0 K/UL 3.1   HGB 11.5 - 16.0 g/dL 9.7 (L)    - 400 K/uL 438 (H)   AST 15 - 37 U/L 20   ALT 12 - 78 U/L 20   Alk Phos 45 - 117 U/L 66   Bili, Total 0.2 - 1.0 MG/DL 0.2   Albumin 3.5 - 5.0 g/dL 3.8   BUN 6 - 20 MG/DL 8   Creat 0.55 - 1.02 MG/DL 0.78   Na 136 - 145 mmol/L 141   K 3.5 - 5.1 mmol/L 3.9   Cl 97 - 108 mmol/L 108   CO2 21 - 32 mmol/L 27   Glucose 65 - 100 mg/dL 76       SEROLOGIES:  Not available or performed. Testing will be performed as indicated. Serologies Latest Ref Rng & Units 11/24/2020   Hep A Ab, Total Negative   Positive (A)   Hep B Surface Ag Index <0.10   Hep B Surface Ag Interp Negative   Negative   Hep B Core Ab, Total Negative   Negative   Hep B Surface Ab mIU/mL <3.10   Hep B Surface Ab Interp NONREACTIVE   NONREACTIVE   Hep C Ab 0.0 - 0.9 s/co ratio <0.1   Ferritin 26 - 388 NG/ML 2 (L)   Iron % Saturation 20 - 50 % 3 (L)   NAZ, IFA  Negative   ASMCA 0 - 19 Units 4   M2 Ab 0.0 - 20.0 Units 33.1 (H)     LIVER HISTOLOGY:  [facility]. EkPa was 4.4. IQR/med 14%. . The results suggested a fibrosis level of F0. The CAP score suggests no evidence of fatty liver. ENDOSCOPIC PROCEDURES:  Not available or performed    RADIOLOGY:  11/2020: Normal abdominal ultrasound.  No evidence of intra or extrahepatic biliary dilatation    OTHER TESTING:  Not available or performed    FOLLOW-UP:  All of the issues listed above in the Assessment and Plan were discussed with the patient. All questions were answered. The patient expressed a clear understanding of the above.     Follow-up [address] 32 in 1 year for routine monitoring      Ulysses Breslow, MD, MPH  Advanced Hepatology  [address] 22.  [phone]  [address] [place] [address]

## 2021-06-22 NOTE — LETTER
6/22/2021 Patient: Zaid Siddiqui YOB: 1980 Date of Visit: 6/22/2021 Gladys Cantrell MD 
[facility] [address] [address] Via Fax: [phone] Dear Gladys Cantrell MD, Thank you for referring Ms. Lobo Shane to [facility] for evaluation. My notes for this consultation are attached. If you have questions, please do not hesitate to call me. I look forward to following your patient along with you. Sincerely, Ronak Hull MD

## 2021-11-01 RX ORDER — PHENTERMINE HYDROCHLORIDE 37.5 MG/1
TABLET ORAL
Qty: 90 TABLET | Refills: 1 | Status: SHIPPED | OUTPATIENT
Start: 2021-11-01 | End: 2022-07-19

## 2022-03-18 PROBLEM — K58.9 IBS (IRRITABLE BOWEL SYNDROME): Status: ACTIVE | Noted: 2020-11-24

## 2022-03-18 PROBLEM — G43.909 MIGRAINE: Status: ACTIVE | Noted: 2020-11-24

## 2022-03-19 PROBLEM — D64.9 ANEMIA: Status: ACTIVE | Noted: 2020-11-24

## 2022-07-19 RX ORDER — PHENTERMINE HYDROCHLORIDE 37.5 MG/1
TABLET ORAL
Qty: 90 TABLET | Refills: 1 | Status: SHIPPED | OUTPATIENT
Start: 2022-07-19

## 2022-07-20 DIAGNOSIS — N39.0 URINARY TRACT INFECTION WITHOUT HEMATURIA, SITE UNSPECIFIED: Primary | ICD-10-CM

## 2022-07-20 RX ORDER — NITROFURANTOIN 25; 75 MG/1; MG/1
100 CAPSULE ORAL 2 TIMES DAILY
Qty: 20 CAPSULE | Refills: 1 | Status: SHIPPED | OUTPATIENT
Start: 2022-07-20

## 2022-08-12 NOTE — OP NOTES
[facility]   [address] [facility], [address] [place] [address] Ave   OP NOTE       Name:  Marlys Sellers   MR#:  [id number]   :  1980   Account #:  [de-identified]    Surgery Date:  11/15/2017   Date of Adm:  11/15/2017       PREOPERATIVE DIAGNOSIS: Constipation. POSTOPERATIVE DIAGNOSES   1. Abnormal study. 2. Balloon expulsion has failed. 3. Abnormal sensory findings demonstrating impaired sensation to all   types of filling. 4. Weak pelvic floor. 5. absent rectal anal inhibitory reflex, rule out Hirschsprung disease. PROCEDURES PERFORMED:    1. High-resolution anorectal manometry. 2. Assessment of anorectal function using balloon. ESTIMATED BLOOD LOSS: none     SPECIMENS REMOVED: none     ANESTHESIA:  none      COMPLICATIONS:      DESCRIPTION OF PROCEDURE: High-resolution anorectal   manometry is performed by the nursing staff with subsequent   interpretation by Dr. Lee Sprague. Sphincter pressures are measured   maximum and mean by rectal and abdominal reference. Maximum   rectal reference pressure 78.0. Mean rectal reference pressure 67.3,   maximum abdominal reference pressure 74.6, mean 63.9. Normals are   greater than 30 mmHg. The patient is then asked to voluntarily squeeze. Normals will increase   squeeze pressure by more than 30 mmHg and sustain for more than   10 seconds. She increases squeeze to 179.4 by rectal reference and   177.1 mmHg by abdominal reference. She sustains squeeze for 21.2   seconds. Push maneuver is then given. Sphincter relaxes 29% to 53.7 mmHg. However, intrarectal pressure at this time is only 6.4 mmHg for a   rectoanal pressure differential of -47. 4. The balloon is then utilized. Balloon expulsion is passed. Rectal anal   inhibitory reflex is not present. The first sensation to rectal filling is at 50 mL, normal should be about   20 mL. The urge to defecate is at 150 mL. Normal should be about 80-  100 mL.  Maximum Subjective   Chief Complaint   Patient presents with   • Depression   • Anxiety     Med refill follow up      Hazel Borden is a 39 y.o. female.     The patient is here today for depression, anxiety, and obesity.    Weight- She is doing well on Adipex and currently weighs 191 lbs. Her blood pressure is controlled.    Depression and anxiety- She is doing well with Pristiq 50 mg and denies side effects; however, she states she was constipated for approximately 1 to 2 weeks, which has now subsided, and she is unsure if the constipation was a side effect of Pristiq.    Lab work- She completed lab work in 04/2022, and her cholesterol was slightly elevated.     I have reviewed the following portions of the patient's history and confirmed they are accurate: allergies, current medications, past family history, past medical history, past social history, past surgical history, and problem list    I have personally completed the patient's review of systems.    Review of Systems   Constitutional: Negative for activity change, appetite change, chills, diaphoresis, fatigue, fever, unexpected weight gain and unexpected weight loss.   HENT: Negative for ear discharge, ear pain, mouth sores, nosebleeds, sinus pressure, sneezing and sore throat.    Eyes: Negative for pain, discharge and itching.   Respiratory: Negative for cough, chest tightness, shortness of breath and wheezing.    Cardiovascular: Negative for chest pain, palpitations and leg swelling.   Gastrointestinal: Negative for abdominal pain, constipation, diarrhea, nausea and vomiting.   Endocrine: Negative for heat intolerance, polydipsia and polyphagia.   Genitourinary: Negative for dysuria, flank pain, frequency, hematuria and urgency.   Musculoskeletal: Negative for arthralgias, back pain, gait problem, joint swelling, myalgias, neck pain and neck stiffness.   Skin: Negative for color change, pallor and rash.   Allergic/Immunologic: Negative for immunocompromised state.  "  Neurological: Negative for seizures, speech difficulty, weakness and numbness.   Hematological: Negative for adenopathy.   Psychiatric/Behavioral: Negative for agitation, decreased concentration, sleep disturbance, suicidal ideas and depressed mood. The patient is not nervous/anxious.        Current Outpatient Medications on File Prior to Visit   Medication Sig   • desvenlafaxine (PRISTIQ) 50 MG 24 hr tablet TAKE 1 TABLET BY MOUTH DAILY   • phentermine (ADIPEX-P) 37.5 MG tablet Take 1 tablet by mouth Every Morning Before Breakfast.     No current facility-administered medications on file prior to visit.       Objective   Vitals:    08/12/22 0904   BP: 102/72   BP Location: Left arm   Patient Position: Sitting   Cuff Size: Adult   Pulse: 73   Resp: 16   Temp: 97.1 °F (36.2 °C)   SpO2: 99%   Weight: 87 kg (191 lb 12.8 oz)   Height: 170.2 cm (67\")   PainSc: 0-No pain     Body mass index is 30.04 kg/m².    Physical Exam  Vitals reviewed.   Constitutional:       Appearance: Normal appearance. She is well-developed.   HENT:      Head: Normocephalic and atraumatic.      Nose: Nose normal.   Eyes:      General: Lids are normal.      Conjunctiva/sclera: Conjunctivae normal.      Pupils: Pupils are equal, round, and reactive to light.   Neck:      Thyroid: No thyromegaly.      Trachea: Trachea normal.   Cardiovascular:      Rate and Rhythm: Normal rate and regular rhythm.      Heart sounds: Normal heart sounds.   Pulmonary:      Effort: Pulmonary effort is normal. No respiratory distress.      Breath sounds: Normal breath sounds.   Skin:     General: Skin is warm and dry.   Neurological:      Mental Status: She is alert and oriented to person, place, and time.      GCS: GCS eye subscore is 4. GCS verbal subscore is 5. GCS motor subscore is 6.   Psychiatric:         Attention and Perception: Attention normal.         Mood and Affect: Mood normal.         Speech: Speech normal.         Behavior: Behavior normal. Behavior is " discomfort is at 200 mL, which is normal (about 180 to 200)    COMMENT: The sensory findings imply a large vault. There also may   be some denervation. The absent rectoanal inhibitory reflex raises   concern for Hirschsprung disease. RECOMMENDATIONS: Recommend the following   1. MRI defecography. 2. Colorectal Surgery consultation. 3. Pelvic floor training.         MD Joan Christie / Taniya Payne   D:  11/21/2017   08:25   T:  11/21/2017   11:51   Job #:  [id number].         Thought Content: Thought content normal.         Assessment & Plan   Problem List Items Addressed This Visit        Mental Health    Depression with anxiety - Primary    Relevant Medications    phentermine (ADIPEX-P) 37.5 MG tablet      Other Visit Diagnoses     Class 1 obesity without serious comorbidity with body mass index (BMI) of 30.0 to 30.9 in adult, unspecified obesity type        Relevant Medications    phentermine (ADIPEX-P) 37.5 MG tablet      1. Depression and anxiety   - Chronic, stable.   - Continue Pristiq.     2. Class I obesity   - Chronic, unstable.   - Continue Adipex. At her next follow-up via telemedicine in 2 months, we'll discuss weaning off this and other alternatives to help further with weight loss.   -Patient was educated on side effects of taking Adipex medications including risk of addiction, increased heart rate, blood pressure, anxiety, insomnia, and dry mouth. Patient verbalized understanding and wants to continue with this medication. Patient will stop medication and schedule earlier follow up if these symptoms occur. Patient will increase water intake, follow a well balanced diet low in cholesterol/carbs/sugars, decrease portion sizes, and increase physical activity.       Current Outpatient Medications:   •  desvenlafaxine (PRISTIQ) 50 MG 24 hr tablet, TAKE 1 TABLET BY MOUTH DAILY, Disp: 30 tablet, Rfl: 5  •  phentermine (ADIPEX-P) 37.5 MG tablet, Take 1 tablet by mouth Every Morning Before Breakfast., Disp: 30 tablet, Rfl: 1       Plan of care reviewed with the patient at the conclusion of today's visit.  Education was provided regarding diagnosis, management, and any prescribed or recommended OTC medications.  Patient verbalized understanding of and agreement with management plan.     Return in about 2 months (around 10/12/2022), or if symptoms worsen or fail to improve.      Transcribed from ambient dictation for RONEN Parada by REMI  [place].  08/12/22   10:15 EDT    Patient verbalized consent to the visit recording.

## 2023-03-13 RX ORDER — PHENTERMINE HYDROCHLORIDE 37.5 MG/1
TABLET ORAL
Qty: 90 TABLET | Refills: 1 | Status: SHIPPED | OUTPATIENT
Start: 2023-03-13

## 2023-03-29 RX ORDER — SEMAGLUTIDE 0.25 MG/.5ML
0.25 INJECTION, SOLUTION SUBCUTANEOUS
Qty: 4 EACH | Refills: 0 | Status: SHIPPED | OUTPATIENT
Start: 2023-03-29

## 2023-03-31 ENCOUNTER — TELEPHONE (OUTPATIENT)
Dept: FAMILY MEDICINE CLINIC | Age: 43
End: 2023-03-31

## 2023-03-31 NOTE — TELEPHONE ENCOUNTER
Pharmacy called stating that patient doesn't meet the requirements for wegovy.  BMI, Weight, to Height ration

## 2023-04-20 DIAGNOSIS — L20.9 ATOPIC DERMATITIS, UNSPECIFIED TYPE: Primary | ICD-10-CM

## 2023-04-20 RX ORDER — HYDROXYZINE HYDROCHLORIDE 10 MG/1
10 TABLET, FILM COATED ORAL
Qty: 30 TABLET | Refills: 0 | Status: SHIPPED | OUTPATIENT
Start: 2023-04-20 | End: 2023-04-30

## 2023-04-20 RX ORDER — PREDNISONE 20 MG/1
20 TABLET ORAL 2 TIMES DAILY WITH MEALS
Qty: 20 TABLET | Refills: 0 | Status: SHIPPED | OUTPATIENT
Start: 2023-04-20

## 2023-05-17 RX ORDER — PREDNISONE 20 MG/1
20 TABLET ORAL 2 TIMES DAILY WITH MEALS
COMMUNITY
Start: 2023-04-20

## 2023-05-17 RX ORDER — SEMAGLUTIDE 0.25 MG/.5ML
0.25 INJECTION, SOLUTION SUBCUTANEOUS
COMMUNITY
Start: 2023-03-29

## 2023-07-26 DIAGNOSIS — M54.6 ACUTE MIDLINE THORACIC BACK PAIN: ICD-10-CM

## 2023-07-26 DIAGNOSIS — M25.512 ACUTE PAIN OF BOTH SHOULDERS: ICD-10-CM

## 2023-07-26 DIAGNOSIS — M54.9 UPPER BACK PAIN: ICD-10-CM

## 2023-07-26 DIAGNOSIS — M54.50 ACUTE MIDLINE LOW BACK PAIN WITHOUT SCIATICA: ICD-10-CM

## 2023-07-26 DIAGNOSIS — M25.532 LEFT WRIST PAIN: ICD-10-CM

## 2023-07-26 DIAGNOSIS — M25.511 ACUTE PAIN OF BOTH SHOULDERS: ICD-10-CM

## 2023-07-26 DIAGNOSIS — M79.602 ARM PAIN, DIFFUSE, LEFT: ICD-10-CM

## 2023-07-26 DIAGNOSIS — M79.601 ARM PAIN, DIFFUSE, RIGHT: Primary | ICD-10-CM
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
## 2023-07-28 ENCOUNTER — HOSPITAL ENCOUNTER (OUTPATIENT)
Facility: HOSPITAL | Age: 43
Discharge: HOME | End: 2023-07-28
Payer: COMMERCIAL

## 2023-07-28 DIAGNOSIS — M79.602 ARM PAIN, DIFFUSE, LEFT: ICD-10-CM

## 2023-07-28 DIAGNOSIS — M25.511 ACUTE PAIN OF BOTH SHOULDERS: ICD-10-CM
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
## 2023-07-28 DIAGNOSIS — M25.532 LEFT WRIST PAIN: ICD-10-CM

## 2023-07-28 DIAGNOSIS — M54.9 UPPER BACK PAIN: ICD-10-CM

## 2023-07-28 DIAGNOSIS — M25.512 ACUTE PAIN OF BOTH SHOULDERS: ICD-10-CM

## 2023-07-28 DIAGNOSIS — M79.601 ARM PAIN, DIFFUSE, RIGHT: ICD-10-CM

## 2023-07-28 DIAGNOSIS — M54.50 ACUTE MIDLINE LOW BACK PAIN WITHOUT SCIATICA: ICD-10-CM

## 2023-07-28 DIAGNOSIS — M54.6 ACUTE MIDLINE THORACIC BACK PAIN: ICD-10-CM

## 2023-07-28 PROCEDURE — 73060 X-RAY EXAM OF HUMERUS: CPT

## 2023-07-28 PROCEDURE — 72040 X-RAY EXAM NECK SPINE 2-3 VW: CPT

## 2023-07-28 PROCEDURE — 72072 X-RAY EXAM THORAC SPINE 3VWS: CPT

## 2023-07-28 PROCEDURE — 73110 X-RAY EXAM OF WRIST: CPT

## 2023-07-28 PROCEDURE — 72100 X-RAY EXAM L-S SPINE 2/3 VWS: CPT

## 2023-07-28 PROCEDURE — 73030 X-RAY EXAM OF SHOULDER: CPT

## 2023-09-21 DIAGNOSIS — N64.4 PAIN OF RIGHT BREAST: ICD-10-CM

## 2023-09-21 DIAGNOSIS — N61.1 LEFT BREAST ABSCESS: ICD-10-CM

## 2023-09-21 DIAGNOSIS — Z12.31 SCREENING MAMMOGRAM, ENCOUNTER FOR: Primary | ICD-10-CM

## 2023-09-26 DIAGNOSIS — N63.32 MASS OF AXILLARY TAIL OF LEFT BREAST: Primary | ICD-10-CM

## 2023-09-26 DIAGNOSIS — N63.31 MASS OF AXILLARY TAIL OF RIGHT BREAST: ICD-10-CM

## 2023-10-02 ENCOUNTER — HOSPITAL ENCOUNTER (OUTPATIENT)
Facility: HOSPITAL | Age: 43
Discharge: HOME | End: 2023-10-05
Payer: COMMERCIAL

## 2023-10-02 ENCOUNTER — HOSPITAL ENCOUNTER (OUTPATIENT)
Facility: HOSPITAL | Age: 43
End: 2023-10-02
Payer: COMMERCIAL

## 2023-10-02 DIAGNOSIS — N63.32 MASS OF AXILLARY TAIL OF LEFT BREAST: ICD-10-CM

## 2023-10-02 DIAGNOSIS — N64.4 PAIN OF RIGHT BREAST: ICD-10-CM

## 2023-10-02 DIAGNOSIS — N63.31 MASS OF AXILLARY TAIL OF RIGHT BREAST: ICD-10-CM

## 2023-10-02 DIAGNOSIS — O91.119: ICD-10-CM

## 2023-10-02 PROCEDURE — G0279 TOMOSYNTHESIS, MAMMO: HCPCS

## 2023-10-02 PROCEDURE — 76642 ULTRASOUND BREAST LIMITED: CPT

## 2023-10-09 ENCOUNTER — HOSPITAL ENCOUNTER (OUTPATIENT)
Facility: HOSPITAL | Age: 43
Discharge: HOME | End: 2023-10-12

## 2023-10-09 ENCOUNTER — TELEPHONE (OUTPATIENT)
Facility: CLINIC | Age: 43
End: 2023-10-09
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
## 2023-10-09 DIAGNOSIS — M79.602 LEFT ARM PAIN: Primary | ICD-10-CM

## 2023-10-09 NOTE — TELEPHONE ENCOUNTER
Imaging tech came in states patient had the left humerus done on 7/28/2023    Questions if you would still like it done?

## 2023-10-19 DIAGNOSIS — F40.298 ISOLATED PHOBIA: Primary | ICD-10-CM

## 2023-10-19 RX ORDER — DIAZEPAM 2 MG/1
6 TABLET ORAL DAILY PRN
Qty: 3 TABLET | Refills: 0 | Status: SHIPPED | OUTPATIENT
Start: 2023-10-19 | End: 2023-10-20

## 2023-10-20 ENCOUNTER — HOSPITAL ENCOUNTER (OUTPATIENT)
Facility: HOSPITAL | Age: 43
Discharge: HOME | End: 2023-10-20
Payer: COMMERCIAL

## 2023-10-20 DIAGNOSIS — M25.512 ACUTE PAIN OF LEFT SHOULDER: Primary | ICD-10-CM
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
## 2023-10-20 DIAGNOSIS — M79.602 LEFT ARM PAIN: ICD-10-CM

## 2023-10-20 PROCEDURE — 73221 MRI JOINT UPR EXTREM W/O DYE: CPT
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
## 2023-10-23 DIAGNOSIS — M79.602 LEFT ARM PAIN: ICD-10-CM

## 2023-10-23 DIAGNOSIS — S43.432A ANTERIOR TO POSTERIOR TEAR OF SUPERIOR GLENOID LABRUM OF LEFT SHOULDER: Primary | ICD-10-CM

## 2023-11-15 DIAGNOSIS — M79.602 LEFT ARM PAIN: Primary | ICD-10-CM

## 2023-11-15 DIAGNOSIS — F40.240 FEAR OF CLOSED SPACES: Primary | ICD-10-CM

## 2023-11-15 RX ORDER — DIAZEPAM 2 MG/1
TABLET ORAL
Qty: 3 TABLET | Refills: 0 | Status: SHIPPED | OUTPATIENT
Start: 2023-11-15 | End: 2023-11-16

## 2023-11-15 RX ORDER — NAPROXEN 500 MG/1
500 TABLET ORAL 2 TIMES DAILY PRN
Qty: 60 TABLET | Refills: 0 | Status: SHIPPED | OUTPATIENT
Start: 2023-11-15

## 2023-11-15 RX ORDER — CYCLOBENZAPRINE HCL 5 MG
5 TABLET ORAL 2 TIMES DAILY PRN
Qty: 10 TABLET | Refills: 0 | Status: SHIPPED | OUTPATIENT
Start: 2023-11-15 | End: 2023-11-25

## 2023-11-15 RX ORDER — HYDROCODONE BITARTRATE AND ACETAMINOPHEN 5; 325 MG/1; MG/1
1 TABLET ORAL EVERY 4 HOURS PRN
Qty: 18 TABLET | Refills: 0 | Status: SHIPPED | OUTPATIENT
Start: 2023-11-15 | End: 2023-11-18

## 2023-11-15 RX ORDER — PREDNISONE 20 MG/1
20 TABLET ORAL 2 TIMES DAILY
Qty: 10 TABLET | Refills: 0 | Status: SHIPPED | OUTPATIENT
Start: 2023-11-15 | End: 2023-11-20

## 2023-12-16 DIAGNOSIS — M62.838 MUSCLE SPASM: Primary | ICD-10-CM

## 2023-12-16 RX ORDER — LORAZEPAM 0.5 MG/1
0.5 TABLET ORAL 3 TIMES DAILY PRN
Qty: 12 TABLET | Refills: 0 | Status: SHIPPED | OUTPATIENT
Start: 2023-12-16 | End: 2023-12-20

## 2024-01-04 DIAGNOSIS — G47.00 INSOMNIA, UNSPECIFIED TYPE: Primary | ICD-10-CM

## 2024-01-04 RX ORDER — ALPRAZOLAM 0.5 MG/1
TABLET ORAL
Qty: 30 TABLET | Refills: 0 | Status: SHIPPED | OUTPATIENT
Start: 2024-01-04 | End: 2024-02-04

## 2024-09-09 DIAGNOSIS — M25.611 LIMITED INTERNAL ROTATION OF GLENOHUMERAL JOINT OF RIGHT SHOULDER: ICD-10-CM

## 2024-09-09 DIAGNOSIS — G89.29 CHRONIC RIGHT SHOULDER PAIN: Primary | ICD-10-CM

## 2024-09-09 DIAGNOSIS — M25.511 CHRONIC RIGHT SHOULDER PAIN: Primary | ICD-10-CM
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
## 2024-09-12 DIAGNOSIS — F40.240 CLAUSTROPHOBIA: Primary | ICD-10-CM

## 2024-09-12 RX ORDER — DIAZEPAM 2 MG
TABLET ORAL
Qty: 2 TABLET | Refills: 0 | Status: SHIPPED | OUTPATIENT
Start: 2024-09-12 | End: 2024-09-14

## 2024-09-20 ENCOUNTER — ANCILLARY ORDERS (OUTPATIENT)
Facility: CLINIC | Age: 44
End: 2024-09-20

## 2024-09-20 ENCOUNTER — HOSPITAL ENCOUNTER (OUTPATIENT)
Facility: HOSPITAL | Age: 44
Discharge: HOME | End: 2024-09-23
Payer: COMMERCIAL

## 2024-09-20 DIAGNOSIS — M25.511 CHRONIC RIGHT SHOULDER PAIN: Primary | ICD-10-CM
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
## 2024-09-20 DIAGNOSIS — M25.611 LIMITED INTERNAL ROTATION OF GLENOHUMERAL JOINT OF RIGHT SHOULDER: ICD-10-CM

## 2024-09-20 DIAGNOSIS — G89.29 CHRONIC RIGHT SHOULDER PAIN: ICD-10-CM

## 2024-09-20 DIAGNOSIS — M25.511 CHRONIC RIGHT SHOULDER PAIN: ICD-10-CM

## 2024-09-20 DIAGNOSIS — G89.29 CHRONIC RIGHT SHOULDER PAIN: Primary | ICD-10-CM

## 2024-09-20 PROCEDURE — 73221 MRI JOINT UPR EXTREM W/O DYE: CPT

## 2024-11-15 ENCOUNTER — HOSPITAL ENCOUNTER (OUTPATIENT)
Facility: HOSPITAL | Age: 44
Discharge: HOME | End: 2024-11-18
Payer: COMMERCIAL

## 2024-11-15 DIAGNOSIS — Z12.31 VISIT FOR SCREENING MAMMOGRAM: ICD-10-CM

## 2024-11-15 PROCEDURE — 77063 BREAST TOMOSYNTHESIS BI: CPT

## 2024-12-09 DIAGNOSIS — D64.9 ANEMIA, UNSPECIFIED TYPE: Primary | ICD-10-CM

## (undated) DEVICE — STOPCOCK IV 4 W TRNSPAR

## (undated) DEVICE — MUI SCIENTIFIC BALLOON

## (undated) DEVICE — SYRINGE 50ML E/T

## (undated) DEVICE — BASIN EMESIS 500CC ROSE 250/CS 60/PLT: Brand: MEDEGEN MEDICAL PRODUCTS, LLC